# Patient Record
Sex: FEMALE | Race: WHITE | NOT HISPANIC OR LATINO | Employment: OTHER | URBAN - METROPOLITAN AREA
[De-identification: names, ages, dates, MRNs, and addresses within clinical notes are randomized per-mention and may not be internally consistent; named-entity substitution may affect disease eponyms.]

---

## 2023-11-17 ENCOUNTER — OFFICE VISIT (OUTPATIENT)
Dept: URGENT CARE | Facility: CLINIC | Age: 58
End: 2023-11-17
Payer: COMMERCIAL

## 2023-11-17 VITALS
HEIGHT: 65 IN | WEIGHT: 133 LBS | OXYGEN SATURATION: 98 % | BODY MASS INDEX: 22.16 KG/M2 | TEMPERATURE: 98.8 F | RESPIRATION RATE: 16 BRPM | HEART RATE: 91 BPM | SYSTOLIC BLOOD PRESSURE: 129 MMHG | DIASTOLIC BLOOD PRESSURE: 85 MMHG

## 2023-11-17 DIAGNOSIS — K13.70 MOUTH LESION: Primary | ICD-10-CM

## 2023-11-17 PROBLEM — K58.9 IRRITABLE BOWEL SYNDROME: Status: ACTIVE | Noted: 2018-05-16

## 2023-11-17 PROBLEM — G89.29 CHRONIC LUMBAR PAIN: Status: ACTIVE | Noted: 2023-02-22

## 2023-11-17 PROBLEM — M54.41 ACUTE BACK PAIN WITH SCIATICA, RIGHT: Status: ACTIVE | Noted: 2019-08-22

## 2023-11-17 PROBLEM — H11.32 SUBCONJUNCTIVAL HEMORRHAGE OF LEFT EYE: Status: ACTIVE | Noted: 2018-06-20

## 2023-11-17 PROBLEM — F31.9 BIPOLAR 1 DISORDER (MULTI): Status: ACTIVE | Noted: 2022-04-07

## 2023-11-17 PROBLEM — Z99.81 DEPENDENCE ON NOCTURNAL OXYGEN THERAPY: Status: ACTIVE | Noted: 2023-02-07

## 2023-11-17 PROBLEM — M54.2 CERVICALGIA: Status: ACTIVE | Noted: 2023-02-22

## 2023-11-17 PROBLEM — D64.9 ANEMIA: Status: ACTIVE | Noted: 2018-11-10

## 2023-11-17 PROBLEM — G31.9: Status: ACTIVE | Noted: 2022-04-25

## 2023-11-17 PROBLEM — G90.A POTS (POSTURAL ORTHOSTATIC TACHYCARDIA SYNDROME): Status: ACTIVE | Noted: 2021-09-30

## 2023-11-17 PROBLEM — M54.50 CHRONIC LUMBAR PAIN: Status: ACTIVE | Noted: 2023-02-22

## 2023-11-17 PROBLEM — E78.5 HYPERLIPIDEMIA: Status: ACTIVE | Noted: 2018-07-09

## 2023-11-17 PROBLEM — R79.89 ELEVATED LFTS: Status: ACTIVE | Noted: 2018-11-10

## 2023-11-17 PROBLEM — F41.9 ANXIETY: Status: ACTIVE | Noted: 2023-11-17

## 2023-11-17 PROBLEM — I10 BENIGN HYPERTENSION: Status: ACTIVE | Noted: 2018-11-10

## 2023-11-17 PROBLEM — H40.003 GLAUCOMA SUSPECT OF BOTH EYES: Status: ACTIVE | Noted: 2018-03-06

## 2023-11-17 PROBLEM — J44.9 COPD (CHRONIC OBSTRUCTIVE PULMONARY DISEASE) (MULTI): Status: ACTIVE | Noted: 2021-09-30

## 2023-11-17 PROBLEM — K56.609 SMALL BOWEL OBSTRUCTION (MULTI): Status: ACTIVE | Noted: 2018-11-17

## 2023-11-17 PROBLEM — R10.13 INTRACTABLE EPIGASTRIC ABDOMINAL PAIN: Status: ACTIVE | Noted: 2018-11-09

## 2023-11-17 PROBLEM — R19.7 DIARRHEA: Status: ACTIVE | Noted: 2018-11-18

## 2023-11-17 PROBLEM — F03.90 DEMENTIA (MULTI): Status: ACTIVE | Noted: 2023-11-17

## 2023-11-17 PROCEDURE — 99212 OFFICE O/P EST SF 10 MIN: CPT | Performed by: PHYSICIAN ASSISTANT

## 2023-11-17 RX ORDER — MECLIZINE HYDROCHLORIDE 25 MG/1
25 TABLET ORAL 3 TIMES DAILY PRN
COMMUNITY
Start: 2022-02-23 | End: 2024-10-29

## 2023-11-17 RX ORDER — OXYBUTYNIN CHLORIDE 10 MG/1
10 TABLET, EXTENDED RELEASE ORAL DAILY
COMMUNITY
Start: 2019-04-28

## 2023-11-17 RX ORDER — TERBINAFINE HYDROCHLORIDE 250 MG/1
250 TABLET ORAL
COMMUNITY
Start: 2023-02-07 | End: 2023-12-27

## 2023-11-17 RX ORDER — ROSUVASTATIN CALCIUM 40 MG/1
40 TABLET, COATED ORAL
COMMUNITY

## 2023-11-17 RX ORDER — NYSTATIN 100000 U/G
1 OINTMENT TOPICAL 2 TIMES DAILY PRN
COMMUNITY
Start: 2021-03-29

## 2023-11-17 RX ORDER — ZOLPIDEM TARTRATE 5 MG/1
5 TABLET ORAL NIGHTLY PRN
COMMUNITY

## 2023-11-17 RX ORDER — METOPROLOL TARTRATE 25 MG/1
12.5 TABLET, FILM COATED ORAL 2 TIMES DAILY
COMMUNITY
Start: 2018-03-06

## 2023-11-17 RX ORDER — MONTELUKAST SODIUM 5 MG/1
5 TABLET, CHEWABLE ORAL
COMMUNITY
Start: 2023-09-27 | End: 2024-03-25

## 2023-11-17 RX ORDER — ROSUVASTATIN CALCIUM 10 MG/1
10 TABLET, COATED ORAL DAILY
COMMUNITY
Start: 2017-12-06

## 2023-11-17 RX ORDER — LANCETS 30 GAUGE
1 EACH MISCELLANEOUS 4 TIMES DAILY PRN
COMMUNITY
Start: 2023-08-25

## 2023-11-17 RX ORDER — PODOFILOX 5 MG/ML
1 SOLUTION TOPICAL 2 TIMES DAILY
COMMUNITY
Start: 2023-09-20

## 2023-11-17 RX ORDER — ACETAMINOPHEN, DIPHENHYDRAMINE HCL, PHENYLEPHRINE HCL 325; 25; 5 MG/1; MG/1; MG/1
10 TABLET ORAL NIGHTLY PRN
COMMUNITY
Start: 2020-02-21

## 2023-11-17 RX ORDER — PREDNISONE 10 MG/1
30 TABLET ORAL DAILY
Qty: 21 TABLET | Refills: 0 | Status: SHIPPED | OUTPATIENT
Start: 2023-11-17 | End: 2023-11-24

## 2023-11-17 RX ORDER — LACTOSE-REDUCED FOOD
325 LIQUID (ML) ORAL 3 TIMES DAILY
COMMUNITY
Start: 2022-05-10

## 2023-11-17 RX ORDER — SUMATRIPTAN SUCCINATE 100 MG/1
50 TABLET ORAL ONCE AS NEEDED
COMMUNITY
Start: 2021-11-15

## 2023-11-17 RX ORDER — VARENICLINE TARTRATE 1 MG/1
1 TABLET, FILM COATED ORAL DAILY
COMMUNITY
Start: 2023-08-03

## 2023-11-17 RX ORDER — UMECLIDINIUM 62.5 UG/1
1 AEROSOL, POWDER ORAL DAILY
COMMUNITY
Start: 2023-02-27

## 2023-11-17 RX ORDER — CHLORHEXIDINE GLUCONATE ORAL RINSE 1.2 MG/ML
15 SOLUTION DENTAL AS NEEDED
Qty: 120 ML | Refills: 0 | Status: SHIPPED | OUTPATIENT
Start: 2023-11-17 | End: 2023-12-01

## 2023-11-17 RX ORDER — SODIUM CHLORIDE 0.65 %
1 AEROSOL, SPRAY (ML) NASAL AS NEEDED
COMMUNITY
Start: 2023-08-31

## 2023-11-17 RX ORDER — LORATADINE 10 MG/1
10 TABLET ORAL
COMMUNITY
Start: 2023-08-10 | End: 2024-08-09

## 2023-11-17 RX ORDER — TRAZODONE HYDROCHLORIDE 100 MG/1
50 TABLET ORAL NIGHTLY
COMMUNITY
Start: 2018-01-03

## 2023-11-17 ASSESSMENT — VISUAL ACUITY: OU: 1

## 2023-11-17 NOTE — PROGRESS NOTES
[facility] URGENT CARE ZINA NOTE:      Name: Romina Garcia, 58 y.o.    CSN:[id number]   MRN:[id number]    PCP: Haven Zamora, APRN-CNP    ALL:    Allergies   Allergen Reactions    Mold Shortness of breath    Iodinated Contrast Media Hives    Iodine Hives    Other Itching     Itcy watering eyes.    Penicillins Itching and Rash    Tramadol Rash     Other reaction(s): Other (See Comments)   Unknown thought was allergic, ER prescribed on 9/12/2021 pt states she will see what happens    Unknown    Adhesive Tape-Silicones Hives    Allerg Xt,D.Farinae-D.Pteronys Other    Animal Dander Other    Cat Dander Other    Corn Other     Upset stomach    Corn Containing Products Other    Dog Dander Other    Grass Pollen Unknown    Ketorolac Tromethamine Itching    Levonorgestrel-Ethinyl Estrad Itching     Itcy watering eyes.    Zolpidem Other     Elevated liver enzymes    Adhesive Hives and Rash    Latex Hives and Rash       History:    Chief Complaint: Edema (Swelling on upper )    Encounter Date: 11/17/2023  09:40HRS    HPI: The history was obtained from the patient and friend (female visitor) . Romina is a 58 y.o. female, who presents with a chief complaint of Edema (Swelling on upper ) noted some prodromal symptoms Tuesday 11/13/23 with increase in fatigue with need for sleep sooner than normally scheduled. Mentions she then develops some right upper lip swelling and noted eraser sized lesion on the inside of her lip. She denies any facial pain, sinus swelling, nasal pain, ulcers nor gingival bleeding. She does wear dentures & cleans them daily. She denies any <11y/o exposures that might contribute to HFMD.    She denies any lips discoloration, throat pain, N/V or palpable glands near jaw nor neck.     PMHx:    Past Medical History:   Diagnosis Date    Hyperlipidemia               Current Outpatient Medications   Medication Sig Dispense Refill    Ayr Saline 0.65 % nasal spray Administer 1 spray  into each nostril if needed for congestion.      Incruse Ellipta 62.5 mcg/actuation inhalation Inhale 1 puff (62.5 mcg) once daily.      linaCLOtide (Linzess) 290 mcg capsule Take 1 capsule (290 mcg) by mouth once daily in the morning. Take before meals.      loratadine (Claritin) 10 mg tablet Take 1 tablet (10 mg) by mouth once daily.      meclizine (Antivert) 25 mg tablet Take 1 tablet (25 mg) by mouth 3 times a day as needed for dizziness.      melatonin 10 mg tablet Take 1 tablet (10 mg) by mouth as needed at bedtime.      metoprolol tartrate (Lopressor) 25 mg tablet Take 0.5 tablets (12.5 mg) by mouth twice a day.      montelukast (Singulair) 5 mg chewable tablet Chew 1 tablet (5 mg) once daily.      nutritional drink (Ensure) liquid Take 325 mL by mouth 3 times a day.      nystatin (Mycostatin) ointment Apply 1 Application topically 2 times a day as needed (fungal rash).      oxybutynin XL (Ditropan-XL) 10 mg 24 hr tablet Take 1 tablet (10 mg) by mouth once daily.      podofilox (Condylox) 0.5 % external solution Apply 1 Application topically 2 times a day.      rosuvastatin (Crestor) 10 mg tablet Take 1 tablet (10 mg) by mouth once daily.      SUMAtriptan (Imitrex) 100 mg tablet Take 0.5 tablets (50 mg) by mouth 1 time if needed for migraine.      terbinafine (LamISIL) 250 mg tablet Take 1 tablet (250 mg) by mouth once daily.      traZODone (Desyrel) 100 mg tablet Take 0.5 tablets (50 mg) by mouth once daily at bedtime.      Ultra Thin Lancets 30 gauge misc Inject 1 Application under the skin 4 times a day as needed.      varenicline (Chantix) 1 mg tablet Take 1 tablet (1 mg) by mouth once daily.      chlorhexidine (Peridex) 0.12 % solution Use 15 mL in the mouth or throat if needed for wound care for up to 14 days. 120 mL 0    predniSONE (Deltasone) 10 mg tablet Take 3 tablets (30 mg) by mouth once daily for 7 days. 21 tablet 0    rosuvastatin (Crestor) 40 mg tablet Take 1 tablet (40 mg) by mouth once daily.       zolpidem (Ambien) 5 mg tablet Take 1 tablet (5 mg) by mouth as needed at bedtime for sleep.       No current facility-administered medications for this visit.         PMSx:  No past surgical history on file.    Fam Hx: No family history on file.    SOC. Hx:     Social History     Socioeconomic History    Marital status:      Spouse name: Not on file    Number of children: Not on file    Years of education: Not on file    Highest education level: Not on file   Occupational History    Not on file   Tobacco Use    Smoking status: Not on file    Smokeless tobacco: Not on file   Substance and Sexual Activity    Alcohol use: Not on file    Drug use: Not on file    Sexual activity: Not on file   Other Topics Concern    Not on file   Social History Narrative    Not on file     Social Determinants of Health     Financial Resource Strain: Not on file   Food Insecurity: Not on file   Transportation Needs: Not on file   Physical Activity: Not on file   Stress: Not on file   Social Connections: Not on file   Intimate Partner Violence: Not on file   Housing Stability: Not on file         Vitals:    11/17/23 0940   BP: 129/85   Pulse: 91   Resp: 16   Temp: 37.1 °C (98.8 °F)   SpO2: 98%     60.3 kg (133 lb)          Physical Exam  Constitutional:       Appearance: Normal appearance. She is normal weight.   HENT:      Head: Normocephalic and atraumatic.      Nose: Nose normal.      Mouth/Throat:      Lips: Lesions (submucosal lesion right upper lip, firm wihtout moderate ulceration could reflect a granuloma or evolving cystic lesion) present.      Mouth: Mucous membranes are moist.      Dentition: Has dentures.      Tongue: No lesions. Tongue does not deviate from midline.      Palate: No mass and lesions.      Pharynx: No posterior oropharyngeal erythema.      Tonsils: No tonsillar exudate.        Comments: No gingival bleeding nor edema.  Eyes:      General: Lids are normal. Vision grossly intact.      Extraocular  Movements: Extraocular movements intact.   Neck:      Thyroid: No thyroid mass or thyromegaly.   Cardiovascular:      Rate and Rhythm: Normal rate and regular rhythm.   Pulmonary:      Effort: Pulmonary effort is normal.      Breath sounds: Normal breath sounds.   Abdominal:      General: Abdomen is flat.   Musculoskeletal:         General: Normal range of motion.      Cervical back: Normal range of motion and neck supple. No erythema or crepitus. No pain with movement.   Lymphadenopathy:      Cervical: No cervical adenopathy.   Skin:     General: Skin is warm.      Findings: No ecchymosis, petechiae, rash or wound.   Neurological:      Mental Status: She is alert and oriented to person, place, and time.   Psychiatric:         Behavior: Behavior normal.         LABORATORY @ RADIOLOGICAL IMAGING (if done):     No testing.     COURSE/MEDICAL DECISION MAKING:    Roimna is a 58 y.o., who presents with a working diagnosis of   1. Mouth lesion     with a differential to include: canker sore, leukoplakia, denture related mucosal disease    Pt will be tx with prednisone & peridex mouthwash, if needed she should see a dentist. Suspect more viral related illness contributing to this mucosal lesions affecting her upper right lip. She has no additional airway concerns other than long standing COPD, which the prednisone will also assist with reducing exacerbation as she is coughing in the room.         Vasiliy Liu PA-C   Advanced Practice Provider  [facility] URGENT CARE

## 2023-11-17 NOTE — PATIENT INSTRUCTIONS
"What causes mouth sores?  Different conditions can cause mouth sores, including:    ?Canker sores - Canker sores are open sores (also called \"ulcers\") that are white or yellow in the middle, and red around the edges (picture 1). Certain things make canker sores more likely to develop. These include certain foods, infections, and biting the tongue or inside of the cheek.    ?Mouth cancer (also called \"oral cancer\") - Mouth cancer can cause sores or growths inside of the mouth or on the lips or tongue (picture 2). Mouth cancer can also cause the inside of the mouth, lips, or tongue to turn white or dark. Usually, these symptoms are not painful at first. Some people find out that they have mouth cancer only after a routine medical or dental exam.    ?Leukoplakia - This condition causes white or gray patches inside of the mouth or on the tongue (picture 3). These patches can be thick and usually develop over time. This condition can happen after something irritates the inside of the mouth. Smoking or chewing tobacco can also cause leukoplakia. Leukoplakia can sometimes turn into mouth cancer after a few years, but this is uncommon.    ?Cheilitis - Cheilitis is a condition that makes the lips look \"chapped\" and get red and scaly (picture 4). A few things can cause cheilitis, including windburn, licking the lips a lot, and certain medicines and foods. There is more than 1 type of cheilitis. \"Actinic\" cheilitis is caused by too much sun and can later turn into lip cancer. \"Angular\" cheilitis is caused by an infection, and usually happens in older people whose dentures don't fit well. It causes redness and cracking in the corners of the mouth.    Besides mouth sores, people can also get dark spots inside of their mouth. In people with dark-colored skin, dark spots are usually normal. But in people with light-colored skin, dark spots can be a sign of a serious problem.    Should I see the doctor, dentist, or nurse?  Yes. See " your doctor, dentist, or nurse if:    ?You have a growth in your mouth, or on your tongue or lips.    ?You have a patch of dry, scaly skin on your lips that doesn't heal.    ?You have a white patch in your mouth or on your tongue.    ?You have a dark spot in your mouth, and have light-colored skin.    ?Your symptoms last longer than 2 weeks, or heal and then come back.    ?You have mouth pain, or trouble eating, swallowing, or talking.    ?Your dentures don't fit well.    Will I need tests?  Maybe. The doctor or nurse will talk with you and do an exam. They might also do a test called a biopsy. For this test, the doctor will take a tiny sample of the sore, growth, or area of color change. Then, another doctor will look at the sample under a microscope.    How are mouth sores treated?  Treatment depends on the cause of your symptoms and whether they bother you.    ?Canker sores usually get better by themselves within a few weeks. To help with pain, you can use an over-the-counter medicine made to soothe canker sores. You can also avoid eating or drinking hot and spicy foods. If your symptoms are severe, your doctor might prescribe a mouthwash or medicine to use on the area.    ?Mouth cancer is usually treated with 1 or more of the following treatments:    Surgery    Radiation therapy - Radiation kills cancer cells.    Chemotherapy - Chemotherapy is the medical term for medicines that kill cancer cells or stop them from growing.    ?Leukoplakia is sometimes treated with surgery to remove the abnormal patches.    ?Treatment for cheilitis depends on the type of cheilitis. Your doctor might recommend that you use petroleum jelly on the area. If you have an infection, they will prescribe an ointment with a medicine in it for you to use on the area. If poorly fitting dentures caused your infection, you should ask your dentist to fix them.

## 2024-02-13 ENCOUNTER — OFFICE VISIT (OUTPATIENT)
Dept: URGENT CARE | Facility: CLINIC | Age: 59
End: 2024-02-13
Payer: COMMERCIAL

## 2024-02-13 VITALS
RESPIRATION RATE: 14 BRPM | OXYGEN SATURATION: 97 % | TEMPERATURE: 98 F | DIASTOLIC BLOOD PRESSURE: 70 MMHG | SYSTOLIC BLOOD PRESSURE: 103 MMHG | HEART RATE: 85 BPM

## 2024-02-13 DIAGNOSIS — L73.9 FOLLICULITIS: Primary | ICD-10-CM

## 2024-02-13 PROCEDURE — 99212 OFFICE O/P EST SF 10 MIN: CPT | Mod: 25 | Performed by: PHYSICIAN ASSISTANT

## 2024-02-13 RX ORDER — MUPIROCIN 20 MG/G
1 OINTMENT TOPICAL
Qty: 3 G | Refills: 0 | Status: SHIPPED | OUTPATIENT
Start: 2024-02-13 | End: 2024-02-20

## 2024-02-13 RX ORDER — BETAMETHASONE VALERATE 1 MG/G
1 CREAM TOPICAL 2 TIMES DAILY
Qty: 2 G | Refills: 0 | Status: SHIPPED | OUTPATIENT
Start: 2024-02-13 | End: 2024-02-20

## 2024-02-13 RX ORDER — CEPHALEXIN 500 MG/1
500 CAPSULE ORAL 2 TIMES DAILY
Qty: 20 CAPSULE | Refills: 0 | Status: SHIPPED | OUTPATIENT
Start: 2024-02-13 | End: 2024-02-23

## 2024-02-13 NOTE — PROGRESS NOTES
[facility] URGENT CARE ZINA NOTE:      Name: Romina Garcia, 58 y.o.    CSN:[id number]   MRN:[id number]    PCP: Haven Zamora, APRN-CNP    ALL:    Allergies   Allergen Reactions    Mold Shortness of breath    Iodinated Contrast Media Hives    Iodine Hives    Other Itching     Itcy watering eyes.    Penicillins Itching and Rash    Tramadol Rash     Other reaction(s): Other (See Comments)   Unknown thought was allergic, ER prescribed on 9/12/2021 pt states she will see what happens    Unknown    Adhesive Tape-Silicones Hives    Allerg Xt,D.Farinae-D.Pteronys Other    Animal Dander Other    Cat Dander Other    Corn Other     Upset stomach    Corn Containing Products Other    Dog Dander Other    Grass Pollen Unknown    Ketorolac Tromethamine Itching    Levonorgestrel-Ethinyl Estrad Itching     Itcy watering eyes.    Zolpidem Other     Elevated liver enzymes    Adhesive Hives and Rash    Latex Hives and Rash       History:    Chief Complaint: BLISTERS (ON THIGHS X 2 DAYS)    Encounter Date: 2/13/2024  1020AM    HPI: The history was obtained from the patient. Romina is a 58 y.o. female, who presents with a chief complaint of BLISTERS (ON THIGHS X 2 DAYS) Pt presents on day 3 of rash located on her interior groin bilaterally. She states that it has been spreading since it started. The pt denies any itching, pain, drainage, swelling, scaling, or fever. She denies any chance of insect bite.    She states she showers every other day and has good hygiene. She did switch body soap, detergent and started a new medication all about a month ago. Pt has no concern for STI.      She said she tried putting a cream on it but does not remember what the name was. She is concerned that this is going to spread to her genital region or that it is contagious.     She is moving permanently to [place] this weekend & I encourage she est. With a GP to discuss ongoing needs.    PMHx:    Past Medical History:    Diagnosis Date    Hyperlipidemia               Current Outpatient Medications   Medication Sig Dispense Refill    Ayr Saline 0.65 % nasal spray Administer 1 spray into each nostril if needed for congestion.      Incruse Ellipta 62.5 mcg/actuation inhalation Inhale 1 puff (62.5 mcg) once daily.      linaCLOtide (Linzess) 290 mcg capsule Take 1 capsule (290 mcg) by mouth once daily in the morning. Take before meals.      loratadine (Claritin) 10 mg tablet Take 1 tablet (10 mg) by mouth once daily.      meclizine (Antivert) 25 mg tablet Take 1 tablet (25 mg) by mouth 3 times a day as needed for dizziness.      melatonin 10 mg tablet Take 1 tablet (10 mg) by mouth as needed at bedtime.      metoprolol tartrate (Lopressor) 25 mg tablet Take 0.5 tablets (12.5 mg) by mouth twice a day.      montelukast (Singulair) 5 mg chewable tablet Chew 1 tablet (5 mg) once daily.      nutritional drink (Ensure) liquid Take 325 mL by mouth 3 times a day.      nystatin (Mycostatin) ointment Apply 1 Application topically 2 times a day as needed (fungal rash).      oxybutynin XL (Ditropan-XL) 10 mg 24 hr tablet Take 1 tablet (10 mg) by mouth once daily.      podofilox (Condylox) 0.5 % external solution Apply 1 Application topically 2 times a day.      rosuvastatin (Crestor) 10 mg tablet Take 1 tablet (10 mg) by mouth once daily.      rosuvastatin (Crestor) 40 mg tablet Take 1 tablet (40 mg) by mouth once daily.      SUMAtriptan (Imitrex) 100 mg tablet Take 0.5 tablets (50 mg) by mouth 1 time if needed for migraine.      suvorexant (Belsomra) 15 mg tablet Take 1 tablet (15 mg) by mouth once daily at bedtime.      traZODone (Desyrel) 100 mg tablet Take 0.5 tablets (50 mg) by mouth once daily at bedtime.      Ultra Thin Lancets 30 gauge misc Inject 1 Application under the skin 4 times a day as needed.      varenicline (Chantix) 1 mg tablet Take 1 tablet (1 mg) by mouth once daily.      zolpidem (Ambien) 5 mg tablet Take 1 tablet (5 mg) by  mouth as needed at bedtime for sleep.       No current facility-administered medications for this visit.         PMSx:  History reviewed. No pertinent surgical history.    Fam Hx: No family history on file.    SOC. Hx:     Social History     Socioeconomic History    Marital status:      Spouse name: Not on file    Number of children: Not on file    Years of education: Not on file    Highest education level: Not on file   Occupational History    Not on file   Tobacco Use    Smoking status: Never    Smokeless tobacco: Never   Substance and Sexual Activity    Alcohol use: Not on file    Drug use: Not on file    Sexual activity: Not on file   Other Topics Concern    Not on file   Social History Narrative    Not on file     Social Determinants of Health     Financial Resource Strain: Not on file   Food Insecurity: Not on file   Transportation Needs: Not on file   Physical Activity: Not on file   Stress: Not on file   Social Connections: Not on file   Intimate Partner Violence: Not on file   Housing Stability: Not on file         Vitals:    02/13/24 1015   BP: 103/70   Pulse: 85   Resp: 14   Temp: 36.7 °C (98 °F)   SpO2: 97%                Physical Exam  Constitutional:       Appearance: Normal appearance. She is normal weight.   HENT:      Head: Normocephalic and atraumatic.      Nose: Nose normal.      Mouth/Throat:      Mouth: Mucous membranes are moist.      Pharynx: Oropharynx is clear.   Eyes:      Extraocular Movements: Extraocular movements intact.      Pupils: Pupils are equal, round, and reactive to light.   Cardiovascular:      Rate and Rhythm: Normal rate and regular rhythm.      Pulses: Normal pulses.      Heart sounds: Normal heart sounds.   Pulmonary:      Effort: Pulmonary effort is normal.      Breath sounds: Normal breath sounds.   Abdominal:      General: Bowel sounds are normal.      Palpations: Abdomen is soft.   Musculoskeletal:         General: Normal range of motion.      Cervical back:  Normal range of motion and neck supple.   Lymphadenopathy:      Lower Body: No right inguinal adenopathy. No left inguinal adenopathy.   Skin:     General: Skin is warm and dry.      Capillary Refill: Capillary refill takes less than 2 seconds.      Findings: Rash present. Rash is papular.      Comments: Located in groin   Neurological:      Mental Status: She is alert and oriented to person, place, and time.   Psychiatric:         Mood and Affect: Mood normal.         Behavior: Behavior normal.         Thought Content: Thought content normal.         Judgment: Judgment normal.              COURSE/MEDICAL DECISION MAKING:    Romina is a 58 y.o., who presents with a working diagnosis of   1. Folliculitis     with a differential to include: Folliculitis, Contact Dermatitis, Atopic dermatitis, Molluscum contagiosum    Folliculitis - Provided pt with antibiotic ointment and topical steroid cream to reduce rash. Educated pt on keeping area clean and reducing use of tight clothing.     Note initiated by:  EVERARDO Viera, OD[employer]    Supervised by  Vasiliy Liu PA-C   Advanced Practice Provider  [facility] URGENT CARE    I was present with the PA student who participated in the documentation of this note. I have personally seen and re-examined the patient and performed the medical decision-making components (assessment and plan of care). I have reviewed the PA student documentation and verified the findings in the note as written with additions or exceptions as stated in the body of this note.    Vasiliy Liu PA-C